# Patient Record
Sex: FEMALE | Race: BLACK OR AFRICAN AMERICAN | NOT HISPANIC OR LATINO | Employment: FULL TIME | URBAN - METROPOLITAN AREA
[De-identification: names, ages, dates, MRNs, and addresses within clinical notes are randomized per-mention and may not be internally consistent; named-entity substitution may affect disease eponyms.]

---

## 2022-12-16 ENCOUNTER — HOSPITAL ENCOUNTER (EMERGENCY)
Facility: HOSPITAL | Age: 37
Discharge: HOME | End: 2022-12-16
Attending: EMERGENCY MEDICINE | Admitting: EMERGENCY MEDICINE

## 2022-12-16 VITALS
SYSTOLIC BLOOD PRESSURE: 112 MMHG | OXYGEN SATURATION: 100 % | TEMPERATURE: 98.1 F | WEIGHT: 190 LBS | HEART RATE: 93 BPM | BODY MASS INDEX: 31.65 KG/M2 | DIASTOLIC BLOOD PRESSURE: 68 MMHG | RESPIRATION RATE: 18 BRPM | HEIGHT: 65 IN

## 2022-12-16 DIAGNOSIS — R41.89 EPISODE OF UNRESPONSIVENESS: Primary | ICD-10-CM

## 2022-12-16 PROCEDURE — 99283 EMERGENCY DEPT VISIT LOW MDM: CPT

## 2022-12-16 PROCEDURE — 99281 EMR DPT VST MAYX REQ PHY/QHP: CPT

## 2022-12-16 RX ORDER — TRAZODONE HYDROCHLORIDE 50 MG/1
50 TABLET ORAL NIGHTLY
COMMUNITY

## 2022-12-16 RX ORDER — TOPIRAMATE 25 MG/1
25 TABLET ORAL 2 TIMES DAILY
COMMUNITY

## 2022-12-16 RX ORDER — VENLAFAXINE 37.5 MG/1
37.5 TABLET ORAL 2 TIMES DAILY
COMMUNITY

## 2022-12-16 NOTE — ED PROVIDER NOTES
Time: 1:42 PM EST    Chief Complaint: unresponsiveness       History of Present Illness:  Patient is a 37 y.o. year old female who presents to the emergency department with unresponsiveness. Per EMS, patient was found unresponsive on the floor face down at her home. Per EMS, patient has some dried vomit on her mouth. However, patient states that the vomit was residual food on her clothes from her workplace, [employer]. Patient states that she recently started to take Topamax, trazodone, and Effexor couple of days ago. Patient states that she is prescribed these medications for anxiety, sleep, and depression. Patient denies any physical complaints. Patient denies any drug usage.Patient states that she wants to leave ED and go home.       History provided by:  Patient and EMS personnel  History limited by: nothing    used: No        Similar Symptoms Previously: no  Recently seen: no      Patient Care Team  Primary Care Provider: Provider, Shagufta Known    Past Medical History:     Allergies   Allergen Reactions   • Penicillins Swelling     Past Medical History:   Diagnosis Date   • Anxiety    • Depression    • PTSD (post-traumatic stress disorder)      History reviewed. No pertinent surgical history.  History reviewed. No pertinent family history.    Home Medications:  Prior to Admission medications    Medication Sig Start Date End Date Taking? Authorizing Provider   topiramate (TOPAMAX) 25 MG tablet Take 25 mg by mouth 2 (Two) Times a Day.    ProviderPlacido MD   traZODone (DESYREL) 50 MG tablet Take 50 mg by mouth Every Night.    ProviderPlacido MD   venlafaxine (EFFEXOR) 37.5 MG tablet Take 37.5 mg by mouth 2 (Two) Times a Day.    ProviderPlacido MD        Social History:   Social History     Tobacco Use   • Smoking status: Never   • Smokeless tobacco: Never   Vaping Use   • Vaping Use: Every day   • Substances: Nicotine, Flavoring   • Devices: Pre-filled or refillable  "cartridge   Substance Use Topics   • Alcohol use: Never   • Drug use: Never         Review of Systems:  Review of Systems   Constitutional: Negative for chills, diaphoresis and fever.   HENT: Negative for congestion, postnasal drip, rhinorrhea and sore throat.    Eyes: Negative for photophobia.   Respiratory: Negative for cough, chest tightness and shortness of breath.    Cardiovascular: Negative for chest pain, palpitations and leg swelling.   Gastrointestinal: Negative for abdominal pain, diarrhea, nausea and vomiting.   Genitourinary: Negative for difficulty urinating, dysuria, flank pain, frequency, hematuria and urgency.   Musculoskeletal: Negative for neck pain and neck stiffness.   Skin: Negative for pallor and rash.   Neurological: Negative for dizziness, syncope, weakness, numbness and headaches.   Hematological: Negative for adenopathy. Does not bruise/bleed easily.   Psychiatric/Behavioral: Negative.         Physical Exam:  /68 (BP Location: Left arm, Patient Position: Sitting)   Pulse 93   Temp 98.1 °F (36.7 °C) (Oral)   Resp 18   Ht 165.1 cm (65\")   Wt 86.2 kg (190 lb)   LMP 12/16/2022   SpO2 100%   BMI 31.62 kg/m²     Physical Exam  Vitals and nursing note reviewed.   Constitutional:       General: She is not in acute distress.     Appearance: Normal appearance. She is not ill-appearing, toxic-appearing or diaphoretic.   HENT:      Head: Normocephalic and atraumatic.      Mouth/Throat:      Mouth: Mucous membranes are moist.   Eyes:      Pupils: Pupils are equal, round, and reactive to light.   Cardiovascular:      Rate and Rhythm: Normal rate and regular rhythm.      Pulses: Normal pulses.           Carotid pulses are 2+ on the right side and 2+ on the left side.       Radial pulses are 2+ on the right side and 2+ on the left side.        Femoral pulses are 2+ on the right side and 2+ on the left side.       Popliteal pulses are 2+ on the right side and 2+ on the left side.        " Dorsalis pedis pulses are 2+ on the right side and 2+ on the left side.        Posterior tibial pulses are 2+ on the right side and 2+ on the left side.      Heart sounds: Normal heart sounds. No murmur heard.  Pulmonary:      Effort: Pulmonary effort is normal. No accessory muscle usage, respiratory distress or retractions.      Breath sounds: Normal breath sounds. No wheezing, rhonchi or rales.   Abdominal:      General: Abdomen is flat. There is no distension.      Palpations: Abdomen is soft. There is no mass.      Tenderness: There is no abdominal tenderness. There is no right CVA tenderness, left CVA tenderness, guarding or rebound.      Comments: No rigidity   Musculoskeletal:         General: No swelling, tenderness or deformity.      Cervical back: Neck supple. No tenderness.      Right lower leg: No edema.      Left lower leg: No edema.   Skin:     General: Skin is warm and dry.      Capillary Refill: Capillary refill takes less than 2 seconds.      Coloration: Skin is not jaundiced or pale.      Findings: No erythema.   Neurological:      General: No focal deficit present.      Mental Status: She is alert and oriented to person, place, and time. Mental status is at baseline.      Sensory: No sensory deficit.      Motor: No weakness.   Psychiatric:         Mood and Affect: Mood normal.         Behavior: Behavior normal.                Medications in the Emergency Department:  Medications - No data to display     Labs  Lab Results (last 24 hours)     ** No results found for the last 24 hours. **           Imaging:  No Radiology Exams Resulted Within Past 24 Hours    Procedures:  Procedures    Progress                      The patient was seen and evaluated in the ED by me.  Medical screening examination was performed.  Patient is fully awake and alert.  She denies any suicidal homicidal ideations.  Patient has no focal neuro findings.  Patient is able to make her own decisions.  Patient does not want any  work-up.  Patient is asking to leave.  Patient was threatening to leave AMA but the nurse was able to keep her in the room until I could at least perform a medical screening exam.  The patient's mother did call and I talked to her on the phone.  The patient's mother is upset that no testing was done.  I tried to explain to the mother the patient is an adult who is alert and oriented x3 with no evidence of suicidal homicidal ideations if the patient wants to leave without any work-up this is her legal right.  Patient will be discharged at her request.      Medical Decision Making:  MDM     Final diagnoses:   Episode of unresponsiveness        Disposition:  ED Disposition     ED Disposition   Discharge    Condition   Stable    Comment   --             This medical record created using voice recognition software.        Documentation assistance provided by Yuliet Roberts acting as scribe for Dilan Prasad DO. Information recorded by the scribe was done at my direction and has been verified and validated by me.          Yuliet Roberts  12/16/22 1428       Dilan Prasad DO  12/16/22 1428

## 2022-12-16 NOTE — DISCHARGE INSTRUCTIONS
Follow your primary care provider.  Call for next available appointment.  Continue current home medications.  Take your medications as prescribed.  Return to the ER for any further episodes of passing out or any other concerns issues that may arise.

## 2024-04-11 ENCOUNTER — APPOINTMENT (OUTPATIENT)
Dept: CT IMAGING | Facility: HOSPITAL | Age: 39
End: 2024-04-11
Payer: COMMERCIAL

## 2024-04-11 ENCOUNTER — HOSPITAL ENCOUNTER (EMERGENCY)
Facility: HOSPITAL | Age: 39
Discharge: OTHER INSTITUTION | End: 2024-04-11
Attending: EMERGENCY MEDICINE
Payer: COMMERCIAL

## 2024-04-11 VITALS
OXYGEN SATURATION: 91 % | SYSTOLIC BLOOD PRESSURE: 148 MMHG | HEIGHT: 65 IN | HEART RATE: 89 BPM | RESPIRATION RATE: 16 BRPM | BODY MASS INDEX: 32.47 KG/M2 | TEMPERATURE: 98.4 F | WEIGHT: 194.89 LBS | DIASTOLIC BLOOD PRESSURE: 90 MMHG

## 2024-04-11 DIAGNOSIS — I60.9 SUBARACHNOID HEMORRHAGE: Primary | ICD-10-CM

## 2024-04-11 LAB
ALBUMIN SERPL-MCNC: 4.4 G/DL (ref 3.5–5.2)
ALBUMIN/GLOB SERPL: 1.3 G/DL
ALP SERPL-CCNC: 61 U/L (ref 39–117)
ALT SERPL W P-5'-P-CCNC: 15 U/L (ref 1–33)
ANION GAP SERPL CALCULATED.3IONS-SCNC: 13.8 MMOL/L (ref 5–15)
AST SERPL-CCNC: 18 U/L (ref 1–32)
BASOPHILS # BLD AUTO: 0.04 10*3/MM3 (ref 0–0.2)
BASOPHILS NFR BLD AUTO: 0.4 % (ref 0–1.5)
BILIRUB SERPL-MCNC: 0.8 MG/DL (ref 0–1.2)
BUN SERPL-MCNC: 12 MG/DL (ref 6–20)
BUN/CREAT SERPL: 18.5 (ref 7–25)
CALCIUM SPEC-SCNC: 9.2 MG/DL (ref 8.6–10.5)
CHLORIDE SERPL-SCNC: 103 MMOL/L (ref 98–107)
CO2 SERPL-SCNC: 22.2 MMOL/L (ref 22–29)
CREAT SERPL-MCNC: 0.65 MG/DL (ref 0.57–1)
DEPRECATED RDW RBC AUTO: 41.8 FL (ref 37–54)
EGFRCR SERPLBLD CKD-EPI 2021: 117.2 ML/MIN/1.73
EOSINOPHIL # BLD AUTO: 0.05 10*3/MM3 (ref 0–0.4)
EOSINOPHIL NFR BLD AUTO: 0.5 % (ref 0.3–6.2)
ERYTHROCYTE [DISTWIDTH] IN BLOOD BY AUTOMATED COUNT: 12.7 % (ref 12.3–15.4)
GLOBULIN UR ELPH-MCNC: 3.4 GM/DL
GLUCOSE SERPL-MCNC: 91 MG/DL (ref 65–99)
HCG INTACT+B SERPL-ACNC: <0.5 MIU/ML
HCT VFR BLD AUTO: 38.9 % (ref 34–46.6)
HGB BLD-MCNC: 13 G/DL (ref 12–15.9)
IMM GRANULOCYTES # BLD AUTO: 0.03 10*3/MM3 (ref 0–0.05)
IMM GRANULOCYTES NFR BLD AUTO: 0.3 % (ref 0–0.5)
INR PPP: 1.02 (ref 0.86–1.15)
LYMPHOCYTES # BLD AUTO: 3.63 10*3/MM3 (ref 0.7–3.1)
LYMPHOCYTES NFR BLD AUTO: 37.2 % (ref 19.6–45.3)
MCH RBC QN AUTO: 29.8 PG (ref 26.6–33)
MCHC RBC AUTO-ENTMCNC: 33.4 G/DL (ref 31.5–35.7)
MCV RBC AUTO: 89.2 FL (ref 79–97)
MONOCYTES # BLD AUTO: 0.99 10*3/MM3 (ref 0.1–0.9)
MONOCYTES NFR BLD AUTO: 10.1 % (ref 5–12)
NEUTROPHILS NFR BLD AUTO: 5.03 10*3/MM3 (ref 1.7–7)
NEUTROPHILS NFR BLD AUTO: 51.5 % (ref 42.7–76)
NRBC BLD AUTO-RTO: 0 /100 WBC (ref 0–0.2)
PLATELET # BLD AUTO: 256 10*3/MM3 (ref 140–450)
PMV BLD AUTO: 10.8 FL (ref 6–12)
POTASSIUM SERPL-SCNC: 3.5 MMOL/L (ref 3.5–5.2)
PROT SERPL-MCNC: 7.8 G/DL (ref 6–8.5)
PROTHROMBIN TIME: 13.6 SECONDS (ref 11.8–14.9)
RBC # BLD AUTO: 4.36 10*6/MM3 (ref 3.77–5.28)
SODIUM SERPL-SCNC: 139 MMOL/L (ref 136–145)
WBC NRBC COR # BLD AUTO: 9.77 10*3/MM3 (ref 3.4–10.8)

## 2024-04-11 PROCEDURE — 84702 CHORIONIC GONADOTROPIN TEST: CPT

## 2024-04-11 PROCEDURE — 70450 CT HEAD/BRAIN W/O DYE: CPT

## 2024-04-11 PROCEDURE — 36415 COLL VENOUS BLD VENIPUNCTURE: CPT

## 2024-04-11 PROCEDURE — 85610 PROTHROMBIN TIME: CPT

## 2024-04-11 PROCEDURE — 85025 COMPLETE CBC W/AUTO DIFF WBC: CPT

## 2024-04-11 PROCEDURE — 99285 EMERGENCY DEPT VISIT HI MDM: CPT

## 2024-04-11 PROCEDURE — 80053 COMPREHEN METABOLIC PANEL: CPT

## 2024-04-11 RX ORDER — PROCHLORPERAZINE MALEATE 5 MG/1
10 TABLET ORAL ONCE
Status: DISCONTINUED | OUTPATIENT
Start: 2024-04-11 | End: 2024-04-11

## 2024-04-11 RX ORDER — DIPHENHYDRAMINE HCL 25 MG
50 CAPSULE ORAL ONCE
Status: DISCONTINUED | OUTPATIENT
Start: 2024-04-11 | End: 2024-04-11

## 2024-04-11 RX ORDER — KETOROLAC TROMETHAMINE 30 MG/ML
30 INJECTION, SOLUTION INTRAMUSCULAR; INTRAVENOUS ONCE
Status: DISCONTINUED | OUTPATIENT
Start: 2024-04-11 | End: 2024-04-11

## 2024-04-11 NOTE — ED PROVIDER NOTES
Time: 3:15 PM EDT  Date of encounter:  4/11/2024  Independent Historian/Clinical History and Information was obtained by:   Patient    History is limited by: N/A    Chief Complaint: Headache      History of Present Illness:  Patient is a 38 y.o. year old female who presents to the emergency department for evaluation of headache with nausea that began yesterday.  Patient denies trauma.    HPI    Patient Care Team  Primary Care Provider: Provider, No Known    Past Medical History:     Allergies   Allergen Reactions    Penicillin G Hives    Penicillins Swelling     Past Medical History:   Diagnosis Date    Anxiety     Depression     PTSD (post-traumatic stress disorder)      History reviewed. No pertinent surgical history.  History reviewed. No pertinent family history.    Home Medications:  Prior to Admission medications    Medication Sig Start Date End Date Taking? Authorizing Provider   topiramate (TOPAMAX) 25 MG tablet Take 25 mg by mouth 2 (Two) Times a Day.  4/11/24  Placido Bernardo MD   traZODone (DESYREL) 50 MG tablet Take 50 mg by mouth Every Night.  4/11/24  Placido Bernardo MD   venlafaxine (EFFEXOR) 37.5 MG tablet Take 37.5 mg by mouth 2 (Two) Times a Day.  4/11/24  Placido Bernardo MD        Social History:   Social History     Tobacco Use    Smoking status: Never    Smokeless tobacco: Never   Vaping Use    Vaping status: Every Day    Substances: Nicotine, Flavoring    Devices: Pre-filled or refillable cartridge   Substance Use Topics    Alcohol use: Never    Drug use: Never         Review of Systems:  Review of Systems   Constitutional:  Negative for chills and fever.   HENT:  Negative for congestion, rhinorrhea and sore throat.    Eyes:  Negative for pain and visual disturbance.   Respiratory:  Negative for apnea, cough, chest tightness and shortness of breath.    Cardiovascular:  Negative for chest pain and palpitations.   Gastrointestinal:  Positive for nausea. Negative for abdominal  "pain, diarrhea and vomiting.   Genitourinary:  Negative for difficulty urinating and dysuria.   Musculoskeletal:  Negative for joint swelling and myalgias.   Skin:  Negative for color change.   Neurological:  Positive for headaches. Negative for seizures.   Psychiatric/Behavioral: Negative.     All other systems reviewed and are negative.       Physical Exam:  /75 (Patient Position: Sitting)   Pulse 86   Temp 98.4 °F (36.9 °C) (Oral)   Resp 16   Ht 165.1 cm (65\")   Wt 88.4 kg (194 lb 14.2 oz)   LMP 04/08/2024   SpO2 98%   Breastfeeding No   BMI 32.43 kg/m²     Physical Exam  Vitals and nursing note reviewed.   Constitutional:       General: She is not in acute distress.     Appearance: Normal appearance. She is not toxic-appearing.   HENT:      Head: Normocephalic and atraumatic.      Jaw: There is normal jaw occlusion.   Eyes:      General: Lids are normal.      Extraocular Movements: Extraocular movements intact.      Conjunctiva/sclera: Conjunctivae normal.      Pupils: Pupils are equal, round, and reactive to light.   Cardiovascular:      Rate and Rhythm: Normal rate and regular rhythm.      Pulses: Normal pulses.      Heart sounds: Normal heart sounds.   Pulmonary:      Effort: Pulmonary effort is normal. No respiratory distress.      Breath sounds: Normal breath sounds. No wheezing or rhonchi.   Abdominal:      General: Abdomen is flat.      Palpations: Abdomen is soft.      Tenderness: There is no abdominal tenderness. There is no guarding or rebound.   Musculoskeletal:         General: Normal range of motion.      Cervical back: Normal range of motion and neck supple.      Right lower leg: No edema.      Left lower leg: No edema.   Skin:     General: Skin is warm and dry.   Neurological:      Mental Status: She is alert and oriented to person, place, and time. Mental status is at baseline.   Psychiatric:         Mood and Affect: Mood normal.            "       Procedures:  Procedures      Medical Decision Making:      Comorbidities that affect care:    Anxiety, depression    External Notes reviewed:          The following orders were placed and all results were independently analyzed by me:  Orders Placed This Encounter   Procedures    CT Head Without Contrast    Comprehensive Metabolic Panel    Protime-INR    CBC Auto Differential    hCG, Quantitative, Pregnancy    Urine Drug Screen - Urine, Clean Catch    IP Consult to Neurosurgery    General MD Inpatient Consult    CBC & Differential       Medications Given in the Emergency Department:  Medications - No data to display     ED Course:         Labs:    Lab Results (last 24 hours)       Procedure Component Value Units Date/Time    Covid-19 + Flu A&B AG, Veritor ([id number]) [[id number]] Collected: 04/11/24 1013    Specimen: Swab Updated: 04/11/24 1015     SARS Antigen Not Detected     Influenza A Antigen EDUAR Not Detected     Influenza B Antigen EDUAR Not Detected     Internal Control Passed     Lot Number [id number]     Expiration Date 2/5/25             Imaging:    CT Head Without Contrast    Result Date: 4/11/2024  CT HEAD WO CONTRAST-  Date of Exam: 4/11/2024, 1:09 P.M.  Indication: Severe HA; 38-year-old female w/ h/o right-sided headache.  Comparison: None available.  Technique: Axial CT images were obtained of the head without contrast administration.  Reconstructed 2D coronal and sagittal images were also obtained. Automated exposure control and iterative construction methods were used.  Findings: Acute subarachnoid hemorrhage is seen, predominantly in the right frontal lobe, such as on image 30 of series 201, image 16 of series 202, image 40 of series 203, and adjacent images. Probably minimal, if any, acute subarachnoid hemorrhage is seen elsewhere. No other definite acute intracranial hemorrhage is seen. No acute skull fracture. No acute scalp contusion. Differential considerations for the findings are many and  would include (but are not necessarily limited to) acute subarachnoid hemorrhage associated with reversible cerebral vasoconstriction syndrome (RCVS), cortical vein thrombosis, head trauma, coagulopathy, ruptured cerebral aneurysm, including a mycotic aneurysm, arterial dissection, vasospasm, and/or vasculitis/angiitis or other vasculopathy. Acute cerebral hemorrhage associated with hypertension (with or without being drug-induced) is also possible. No definite acute infarct is seen. No midline shift or acute intracranial herniation syndrome. There is prominence of the extra-axial spaces, especially in the posterior fossa. The ventricular size and configuration are thought to be within normal limits. No significant acute findings are seen with regard to the imaged orbits, paranasal sinuses, or middle ear clefts.      A small volume of acute subarachnoid hemorrhage is seen, predominantly in high anterior (rostral) right frontal lobe, with differential considerations as described.  A preliminary report was phoned to Dr. Moran at approximately 2:35 p.m. on 4/11/2024. It was learned that Dr. Moran was not assigned to the patient. However, Dr. Moran agreed to relaying the findings to the attending [facility] Emergency Department Physician.  Electronically Signed By-Andre Cheng MD On:4/11/2024 2:45 PM         Differential Diagnosis and Discussion:    Headache: Differential diagnosis includes but is not limited to migraine, cluster headache, hypertension, tumor, subarachnoid bleeding, pseudotumor cerebri, temporal arteritis, infections, tension headache, and TMJ syndrome.    CT scan radiology impression was interpreted by me.    MDM     Amount and/or Complexity of Data Reviewed  Tests in the radiology section of CPT®: reviewed       CT scan shows evidence of subarachnoid hemorrhage.  Patient is alert and answering questions at this time.  Patient's blood pressure is 131/75.  I spoke to [facility] neurosurgery and   states to transfer the patient ED to ED and neurosurgery will see the patient when they get there.  I spoke to ED physician Dr. Bowie who states they will accept the patient via transfer.          Patient Care Considerations:          Consultants/Shared Management Plan:    I spoke to [facility] and  states to transfer the patient ED to ED and neurosurgery will see the patient when they get there.  I spoke to ED physician Dr. Bowie who states they will accept the patient via transfer.    Social Determinants of Health:          Disposition and Care Coordination:    Transferred: Through independent evaluation of the patient's history, physical, and imperical data, the patient meets criteria to be transferred to another hospital for evaluation/admission.        Final diagnoses:   Subarachnoid hemorrhage        ED Disposition       ED Disposition   Transfer to Another Facility     Condition   --    Comment   --               This medical record created using voice recognition software.             Gurdeep Hatch PA-C  04/11/24 1518

## 2024-04-11 NOTE — ED PROVIDER NOTES
"SHARED VISIT NOTE:    Patient is 38 y.o. year old female that presents to the ED for evaluation of headache.     Physical Exam    ED Course:    /75 (Patient Position: Sitting)   Pulse 86   Temp 98.4 °F (36.9 °C) (Oral)   Resp 16   Ht 165.1 cm (65\")   Wt 88.4 kg (194 lb 14.2 oz)   LMP 04/08/2024   SpO2 98%   Breastfeeding No   BMI 32.43 kg/m²   Results for orders placed or performed during the hospital encounter of 04/11/24   Covid-19 + Flu A&B AG, Veritor ([id number])    Specimen: Swab   Result Value Ref Range    SARS Antigen Not Detected Not Detected, Presumptive Negative    Influenza A Antigen EDUAR Not Detected Not Detected    Influenza B Antigen EDUAR Not Detected Not Detected    Internal Control Passed Passed    Lot Number [id number]     Expiration Date 2/5/25      Medications - No data to display  CT Head Without Contrast    Result Date: 4/11/2024  Narrative: CT HEAD WO CONTRAST-  Date of Exam: 4/11/2024, 1:09 P.M.  Indication: Severe HA; 38-year-old female w/ h/o right-sided headache.  Comparison: None available.  Technique: Axial CT images were obtained of the head without contrast administration.  Reconstructed 2D coronal and sagittal images were also obtained. Automated exposure control and iterative construction methods were used.  Findings: Acute subarachnoid hemorrhage is seen, predominantly in the right frontal lobe, such as on image 30 of series 201, image 16 of series 202, image 40 of series 203, and adjacent images. Probably minimal, if any, acute subarachnoid hemorrhage is seen elsewhere. No other definite acute intracranial hemorrhage is seen. No acute skull fracture. No acute scalp contusion. Differential considerations for the findings are many and would include (but are not necessarily limited to) acute subarachnoid hemorrhage associated with reversible cerebral vasoconstriction syndrome (RCVS), cortical vein thrombosis, head trauma, coagulopathy, ruptured cerebral aneurysm, including a " mycotic aneurysm, arterial dissection, vasospasm, and/or vasculitis/angiitis or other vasculopathy. Acute cerebral hemorrhage associated with hypertension (with or without being drug-induced) is also possible. No definite acute infarct is seen. No midline shift or acute intracranial herniation syndrome. There is prominence of the extra-axial spaces, especially in the posterior fossa. The ventricular size and configuration are thought to be within normal limits. No significant acute findings are seen with regard to the imaged orbits, paranasal sinuses, or middle ear clefts.      Impression: A small volume of acute subarachnoid hemorrhage is seen, predominantly in high anterior (rostral) right frontal lobe, with differential considerations as described.  A preliminary report was phoned to Dr. Moran at approximately 2:35 p.m. on 4/11/2024. It was learned that Dr. Moran was not assigned to the patient. However, Dr. Moran agreed to relaying the findings to the attending [facility] Emergency Department Physician.  Electronically Signed By-Andre Cheng MD On:4/11/2024 2:45 PM       MDM:    Procedures    CT scan radiology impression was interpreted by me.      SHARED VISIT ATTESTATION:    This visit was performed by both myself and an APC.  I performed the substantive portion of the medical decision making. The management plan was made or approved by me, and I take responsibility for patient management.           Rose Moran MD  15:16 EDT  04/11/24         Rose Moran MD  04/11/24 [id number]

## 2024-04-11 NOTE — ED NOTES
"Pt was not in room when nurse went into room 3 times to assess pt. Pt was in lobby last time nurse went to see pt and pt was told they have to stay in room if they want to be seen. Pt stated that her phone needed to be charged. Nurse told pt that we can look for a  but pt still needs to stay in room in order to be seen. Pt was informed if they left again they would have to recheck back in. Pt said \"you are just mean\" nurse told pt they were not being mean but just informing the pt the hospital policy if they wanted to be seen today.   "

## 2024-04-14 ENCOUNTER — APPOINTMENT (OUTPATIENT)
Dept: CT IMAGING | Facility: HOSPITAL | Age: 39
End: 2024-04-14
Payer: COMMERCIAL

## 2024-04-14 ENCOUNTER — HOSPITAL ENCOUNTER (EMERGENCY)
Facility: HOSPITAL | Age: 39
Discharge: HOME | End: 2024-04-15
Attending: EMERGENCY MEDICINE
Payer: COMMERCIAL

## 2024-04-14 DIAGNOSIS — R51.9 ACUTE NONINTRACTABLE HEADACHE, UNSPECIFIED HEADACHE TYPE: Primary | ICD-10-CM

## 2024-04-14 DIAGNOSIS — I60.9 SUBARACHNOID HEMORRHAGE: ICD-10-CM

## 2024-04-14 LAB
ALBUMIN SERPL-MCNC: 4.1 G/DL (ref 3.5–5.2)
ALBUMIN/GLOB SERPL: 1.2 G/DL
ALP SERPL-CCNC: 61 U/L (ref 39–117)
ALT SERPL W P-5'-P-CCNC: 16 U/L (ref 1–33)
ANION GAP SERPL CALCULATED.3IONS-SCNC: 9.5 MMOL/L (ref 5–15)
AST SERPL-CCNC: 16 U/L (ref 1–32)
BASOPHILS # BLD AUTO: 0.04 10*3/MM3 (ref 0–0.2)
BASOPHILS NFR BLD AUTO: 0.4 % (ref 0–1.5)
BILIRUB SERPL-MCNC: 1 MG/DL (ref 0–1.2)
BUN SERPL-MCNC: 8 MG/DL (ref 6–20)
BUN/CREAT SERPL: 10.4 (ref 7–25)
CALCIUM SPEC-SCNC: 9 MG/DL (ref 8.6–10.5)
CHLORIDE SERPL-SCNC: 104 MMOL/L (ref 98–107)
CO2 SERPL-SCNC: 24.5 MMOL/L (ref 22–29)
CREAT SERPL-MCNC: 0.77 MG/DL (ref 0.57–1)
DEPRECATED RDW RBC AUTO: 40.6 FL (ref 37–54)
EGFRCR SERPLBLD CKD-EPI 2021: 102.7 ML/MIN/1.73
EOSINOPHIL # BLD AUTO: 0.15 10*3/MM3 (ref 0–0.4)
EOSINOPHIL NFR BLD AUTO: 1.5 % (ref 0.3–6.2)
ERYTHROCYTE [DISTWIDTH] IN BLOOD BY AUTOMATED COUNT: 12.5 % (ref 12.3–15.4)
GLOBULIN UR ELPH-MCNC: 3.4 GM/DL
GLUCOSE SERPL-MCNC: 102 MG/DL (ref 65–99)
HCT VFR BLD AUTO: 38 % (ref 34–46.6)
HGB BLD-MCNC: 12.5 G/DL (ref 12–15.9)
HOLD SPECIMEN: NORMAL
HOLD SPECIMEN: NORMAL
IMM GRANULOCYTES # BLD AUTO: 0.02 10*3/MM3 (ref 0–0.05)
IMM GRANULOCYTES NFR BLD AUTO: 0.2 % (ref 0–0.5)
LYMPHOCYTES # BLD AUTO: 4.08 10*3/MM3 (ref 0.7–3.1)
LYMPHOCYTES NFR BLD AUTO: 41.4 % (ref 19.6–45.3)
MCH RBC QN AUTO: 29.6 PG (ref 26.6–33)
MCHC RBC AUTO-ENTMCNC: 32.9 G/DL (ref 31.5–35.7)
MCV RBC AUTO: 89.8 FL (ref 79–97)
MONOCYTES # BLD AUTO: 0.86 10*3/MM3 (ref 0.1–0.9)
MONOCYTES NFR BLD AUTO: 8.7 % (ref 5–12)
NEUTROPHILS NFR BLD AUTO: 4.71 10*3/MM3 (ref 1.7–7)
NEUTROPHILS NFR BLD AUTO: 47.8 % (ref 42.7–76)
NRBC BLD AUTO-RTO: 0 /100 WBC (ref 0–0.2)
PLATELET # BLD AUTO: 245 10*3/MM3 (ref 140–450)
PMV BLD AUTO: 10.8 FL (ref 6–12)
POTASSIUM SERPL-SCNC: 3.7 MMOL/L (ref 3.5–5.2)
PROT SERPL-MCNC: 7.5 G/DL (ref 6–8.5)
RBC # BLD AUTO: 4.23 10*6/MM3 (ref 3.77–5.28)
SODIUM SERPL-SCNC: 138 MMOL/L (ref 136–145)
WBC NRBC COR # BLD AUTO: 9.86 10*3/MM3 (ref 3.4–10.8)
WHOLE BLOOD HOLD COAG: NORMAL
WHOLE BLOOD HOLD SPECIMEN: NORMAL

## 2024-04-14 PROCEDURE — 25010000002 MORPHINE PER 10 MG: Performed by: EMERGENCY MEDICINE

## 2024-04-14 PROCEDURE — 99284 EMERGENCY DEPT VISIT MOD MDM: CPT

## 2024-04-14 PROCEDURE — 70450 CT HEAD/BRAIN W/O DYE: CPT

## 2024-04-14 PROCEDURE — 85025 COMPLETE CBC W/AUTO DIFF WBC: CPT | Performed by: EMERGENCY MEDICINE

## 2024-04-14 PROCEDURE — 96375 TX/PRO/DX INJ NEW DRUG ADDON: CPT

## 2024-04-14 PROCEDURE — 96374 THER/PROPH/DIAG INJ IV PUSH: CPT

## 2024-04-14 PROCEDURE — 25010000002 ONDANSETRON PER 1 MG: Performed by: EMERGENCY MEDICINE

## 2024-04-14 PROCEDURE — 80053 COMPREHEN METABOLIC PANEL: CPT | Performed by: EMERGENCY MEDICINE

## 2024-04-14 RX ORDER — ONDANSETRON 2 MG/ML
4 INJECTION INTRAMUSCULAR; INTRAVENOUS ONCE
Status: COMPLETED | OUTPATIENT
Start: 2024-04-14 | End: 2024-04-14

## 2024-04-14 RX ORDER — SODIUM CHLORIDE 0.9 % (FLUSH) 0.9 %
10 SYRINGE (ML) INJECTION AS NEEDED
Status: DISCONTINUED | OUTPATIENT
Start: 2024-04-14 | End: 2024-04-15 | Stop reason: HOSPADM

## 2024-04-14 RX ADMIN — MORPHINE SULFATE 4 MG: 4 INJECTION, SOLUTION INTRAMUSCULAR; INTRAVENOUS at 23:12

## 2024-04-14 RX ADMIN — ONDANSETRON 4 MG: 2 INJECTION INTRAMUSCULAR; INTRAVENOUS at 23:08

## 2024-04-15 VITALS
TEMPERATURE: 98.2 F | HEART RATE: 74 BPM | WEIGHT: 194.67 LBS | BODY MASS INDEX: 32.43 KG/M2 | DIASTOLIC BLOOD PRESSURE: 80 MMHG | RESPIRATION RATE: 12 BRPM | SYSTOLIC BLOOD PRESSURE: 119 MMHG | HEIGHT: 65 IN | OXYGEN SATURATION: 99 %

## 2024-04-15 PROCEDURE — 96376 TX/PRO/DX INJ SAME DRUG ADON: CPT

## 2024-04-15 PROCEDURE — 25010000002 MORPHINE PER 10 MG: Performed by: EMERGENCY MEDICINE

## 2024-04-15 RX ORDER — BUTALBITAL, ACETAMINOPHEN AND CAFFEINE 50; 325; 40 MG/1; MG/1; MG/1
1 TABLET ORAL EVERY 6 HOURS PRN
Qty: 12 TABLET | Refills: 0 | Status: SHIPPED | OUTPATIENT
Start: 2024-04-15 | End: 2024-04-18

## 2024-04-15 RX ADMIN — MORPHINE SULFATE 4 MG: 4 INJECTION, SOLUTION INTRAMUSCULAR; INTRAVENOUS at 00:23

## 2024-04-15 NOTE — ED PROVIDER NOTES
Time: 11:45 PM EDT  Date of encounter:  4/14/2024  Independent Historian/Clinical History and Information was obtained by:   Patient  Chief Complaint: Headache    History is limited by: N/A    History of Present Illness:  Patient is a 38 y.o. year old female who presents to the emergency department for evaluation of headache.  Patient notes that she presented to the emergency department on April 11 3 days ago with a thunderclap headache.  The patient was diagnosed with a subarachnoid hemorrhage.  The patient was transferred to [facility] for neurosurgical evaluation.  She states that she was actually discharged home that day.  The patient states that she has had a constant headache since that time.  She does have occasional intermittent exacerbations.  She does have some nausea with no vomiting.  The patient has no neurological symptoms.  The patient has no change in vision, speech or swallowing.  She has no focal numbness, weakness, difficulties with coordination, speech or balance.  She is unsure what they told her what was the cause of the subarachnoid hemorrhage.  The patient denies any recent trauma to her head.  She denies pregnancy.  She does note that they started on Keppra during that visit    HPI    Patient Care Team  Primary Care Provider: Provider, No Known    Past Medical History:     Allergies   Allergen Reactions    Penicillin G Hives    Penicillins Swelling     Past Medical History:   Diagnosis Date    Anxiety     Depression     PTSD (post-traumatic stress disorder)      History reviewed. No pertinent surgical history.  History reviewed. No pertinent family history.    Home Medications:  Prior to Admission medications    Not on File        Social History:   Social History     Tobacco Use    Smoking status: Never    Smokeless tobacco: Never   Vaping Use    Vaping status: Every Day    Substances: Nicotine, Flavoring    Devices: Pre-filled or refillable cartridge   Substance Use Topics    Alcohol use: Never  "   Drug use: Never         Review of Systems:  Review of Systems   Constitutional:  Negative for chills, diaphoresis and fever.   HENT:  Negative for congestion, postnasal drip, rhinorrhea and sore throat.    Eyes:  Negative for photophobia.   Respiratory:  Negative for cough, chest tightness and shortness of breath.    Cardiovascular:  Negative for chest pain, palpitations and leg swelling.   Gastrointestinal:  Positive for nausea and vomiting. Negative for abdominal pain and diarrhea.   Genitourinary:  Negative for difficulty urinating, dysuria, flank pain, frequency, hematuria and urgency.   Musculoskeletal:  Negative for neck pain and neck stiffness.   Skin:  Negative for pallor and rash.   Neurological:  Positive for headaches. Negative for dizziness, syncope, weakness and numbness.   Hematological:  Negative for adenopathy. Does not bruise/bleed easily.   Psychiatric/Behavioral: Negative.          Physical Exam:  /80 (Patient Position: Lying)   Pulse 74   Temp 98.2 °F (36.8 °C)   Resp 12   Ht 165.1 cm (65\")   Wt 88.3 kg (194 lb 10.7 oz)   LMP 04/08/2024   SpO2 99%   BMI 32.39 kg/m²     Physical Exam  Vitals and nursing note reviewed.   Constitutional:       General: She is not in acute distress.     Appearance: Normal appearance. She is not ill-appearing, toxic-appearing or diaphoretic.   HENT:      Head: Normocephalic and atraumatic.      Mouth/Throat:      Mouth: Mucous membranes are moist.   Eyes:      Pupils: Pupils are equal, round, and reactive to light.   Cardiovascular:      Rate and Rhythm: Normal rate and regular rhythm.      Pulses: Normal pulses.           Carotid pulses are 2+ on the right side and 2+ on the left side.       Radial pulses are 2+ on the right side and 2+ on the left side.        Femoral pulses are 2+ on the right side and 2+ on the left side.       Popliteal pulses are 2+ on the right side and 2+ on the left side.        Dorsalis pedis pulses are 2+ on the right side " and 2+ on the left side.        Posterior tibial pulses are 2+ on the right side and 2+ on the left side.      Heart sounds: Normal heart sounds. No murmur heard.  Pulmonary:      Effort: Pulmonary effort is normal. No accessory muscle usage, respiratory distress or retractions.      Breath sounds: Normal breath sounds. No wheezing, rhonchi or rales.   Abdominal:      General: Abdomen is flat. There is no distension.      Palpations: Abdomen is soft. There is no mass or pulsatile mass.      Tenderness: There is no abdominal tenderness. There is no right CVA tenderness, left CVA tenderness, guarding or rebound.      Comments: No rigidity   Musculoskeletal:         General: No swelling, tenderness or deformity.      Cervical back: Neck supple. No rigidity or tenderness. No spinous process tenderness. Normal range of motion.      Right lower leg: No edema.      Left lower leg: No edema.   Skin:     General: Skin is warm and dry.      Capillary Refill: Capillary refill takes less than 2 seconds.      Coloration: Skin is not jaundiced or pale.      Findings: No erythema.   Neurological:      General: No focal deficit present.      Mental Status: She is alert and oriented to person, place, and time. Mental status is at baseline.      Cranial Nerves: Cranial nerves 2-12 are intact. No cranial nerve deficit.      Sensory: Sensation is intact. No sensory deficit.      Motor: Motor function is intact. No weakness or pronator drift.      Coordination: Coordination is intact. Coordination normal.   Psychiatric:         Mood and Affect: Mood normal.         Behavior: Behavior normal.                  Procedures:  Procedures      Medical Decision Making:      Comorbidities that affect care:    Anxiety, depression, PTSD    External Notes reviewed:    None      The following orders were placed and all results were independently analyzed by me:  Orders Placed This Encounter   Procedures    CT Head Without Contrast    Comprehensive  Metabolic Panel    [place] Draw    CBC Auto Differential    CBC & Differential    Green Top (Gel)    Lavender Top    Gold Top - SST    Light Blue Top       Medications Given in the Emergency Department:  Medications   morphine injection 4 mg (4 mg Intravenous Given 4/14/24 2312)   ondansetron (ZOFRAN) injection 4 mg (4 mg Intravenous Given 4/14/24 2308)   morphine injection 4 mg (4 mg Intravenous Given 4/15/24 0023)        ED Course:         Labs:    Lab Results (last 24 hours)       ** No results found for the last 24 hours. **             Imaging:    No Radiology Exams Resulted Within Past 24 Hours      Differential Diagnosis and Discussion:    Headache: Differential diagnosis includes but is not limited to migraine, cluster headache, hypertension, tumor, subarachnoid bleeding, pseudotumor cerebri, temporal arteritis, infections, tension headache, and TMJ syndrome.    CT scan radiology impression was interpreted by me.    MDM  Number of Diagnoses or Management Options  Acute nonintractable headache, unspecified headache type  Subarachnoid hemorrhage  Diagnosis management comments:     The patient's CMP was reviewed and shows no abnormalities of critical concern.  Of note, the patient's sodium and potassium are acceptable.  The patient's liver enzymes are unremarkable.  The patient's renal function including creatinine is preserved.  The patient has a normal anion gap.    The patient's CBC was reviewed and shows no abnormalities of critical concern.  Of note, there is no anemia requiring a blood transfusion and the platelet count is acceptable    CT scan of the brain demonstrates right frontal subarachnoid hemorrhage persisting although it is slightly less dense than on recent prior study compatible with evolution of hemorrhage.  There is no new hemorrhage.  The remainder of the examination is normal.    I have spoken with this patient.  I have explained the patient's condition, diagnosis and treatment plan based  on the information available to me at this time.  I have answered the patient's questions and addressed any concerns.  The patient has a good understanding of the patient's diagnosis condition and treatment plan as can be expected at this point.  The vital signs have been stable.  The patient's condition is stable and appropriate for discharge from the emergency department.     Patient will pursue further outpatient evaluation with the primary care physician or other designated or consulting physicians as outlined in the discharge instruction.  The patient is agreeable to this plan of care and follow-up instructions have been explained in detail.  The patient has received these instructions in written format and has expressed understanding of the discharge instructions.  The patient is aware that any significant change in condition or worsening of symptoms should prompt immediate return to this or the closest emergency department or call 911       Amount and/or Complexity of Data Reviewed  Clinical lab tests: reviewed  Tests in the radiology section of CPT®: reviewed  Tests in the medicine section of CPT®: reviewed  Discuss the patient with other providers: yes (00:14 EDT  I have reviewed the case with Dr. Fontaine, emergency medicine physician at [facility].  We have reviewed the patient's record at [facility].  He states the patient had a CT angiogram of the head and neck.  There is no evidence of aneurysm, arteriovenous malformation or aortic dissection.  He states the patient was not actually admitted there just seen in the ER.  He request that we try pain medicine and try to get the patient's headache under control with outpatient follow-up.  The patient is supposed to follow-up with )             Social Determinants of Health:    Patient is independent, reliable, and has access to care.       Disposition and Care Coordination:    Discharged: The patient is suitable and stable for discharge  with no need for consideration of admission.    I have explained discharge medications and the need for follow up with the patient/caretakers. This was also printed in the discharge instructions. Patient was discharged with the following medications and follow up:      Medication List        New Prescriptions      butalbital-acetaminophen-caffeine -40 MG per tablet  Commonly known as: FIORICET, ESGIC  Take 1 tablet by mouth Every 6 (Six) Hours As Needed for Headache for up to 3 days.               Where to Get Your Medications        These medications were sent to [id number] - [place], [place] - [address] AT [facility] OF [address] & [place] - [phone]  - [phone]   [address] [place] [address]      Phone: [phone]   butalbital-acetaminophen-caffeine -40 MG per tablet      Ro Robin MD  [address] [place] [address]  [address] [place] [address] [phone]    Call on 4/15/2024         Final diagnoses:   Acute nonintractable headache, unspecified headache type   Subarachnoid hemorrhage        ED Disposition       ED Disposition   Discharge    Condition   Stable    Comment   --               This medical record created using voice recognition software.             Gurdeep Kendrick DO  04/16/24 0313

## 2024-04-15 NOTE — ED NOTES
Patient states that headache worsens when she moves from side to side or stands. Patient also states that pain is still holding at  a 6/10.

## 2024-04-15 NOTE — DISCHARGE INSTRUCTIONS
Please return to emergency immediately for intractable headache, vomiting, altered mental status, change in vision, change in speech, dizziness, numbness or weakness in your arms or legs, imbalance or any new symptoms that you are concerned with

## 2024-07-08 ENCOUNTER — TELEPHONE (OUTPATIENT)
Dept: ORTHOPEDIC SURGERY | Facility: CLINIC | Age: 39
End: 2024-07-08
Payer: COMMERCIAL

## 2024-07-08 NOTE — TELEPHONE ENCOUNTER
LEFT ELBOW/UPPER EXTREMITY CELLULITIS. SEEN AT [facility] ON 7/3 NOTES STATE INSECT BITE WITH WOUND. DOES NOT HAVE A PCP WAS REFERRED TO [facility]